# Patient Record
Sex: FEMALE | Race: WHITE | NOT HISPANIC OR LATINO | URBAN - METROPOLITAN AREA
[De-identification: names, ages, dates, MRNs, and addresses within clinical notes are randomized per-mention and may not be internally consistent; named-entity substitution may affect disease eponyms.]

---

## 2023-11-14 ENCOUNTER — HOSPITAL ENCOUNTER (OUTPATIENT)
Dept: RADIOLOGY | Facility: EXTERNAL LOCATION | Age: 43
Discharge: HOME | End: 2023-11-14
Payer: COMMERCIAL

## 2023-11-15 DIAGNOSIS — R92.8 ABNORMAL MRI, BREAST: Primary | ICD-10-CM
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
## 2023-11-16 PROBLEM — R79.89 LOW VITAMIN D LEVEL: Status: ACTIVE | Noted: 2019-07-09

## 2023-11-16 PROBLEM — S06.0X0A CONCUSSION WITH NO LOSS OF CONSCIOUSNESS: Status: ACTIVE | Noted: 2019-04-22

## 2023-11-16 PROBLEM — O26.21: Status: ACTIVE | Noted: 2021-02-13

## 2023-11-16 PROBLEM — Z98.890 HISTORY OF LOOP ELECTRICAL EXCISION PROCEDURE (LEEP): Status: ACTIVE | Noted: 2021-02-13

## 2023-11-16 PROBLEM — M54.30 SCIATIC LEG PAIN: Status: ACTIVE | Noted: 2019-04-22

## 2023-11-16 PROBLEM — S13.4XXA WHIPLASH INJURIES, INITIAL ENCOUNTER: Status: ACTIVE | Noted: 2019-04-22

## 2023-11-16 PROBLEM — S16.1XXA CERVICAL MYOFASCIAL STRAIN: Status: ACTIVE | Noted: 2019-04-22

## 2023-11-16 PROBLEM — N60.91 ATYPICAL DUCTAL HYPERPLASIA OF RIGHT BREAST: Status: ACTIVE | Noted: 2018-06-05

## 2023-11-16 PROBLEM — F41.8 ANXIETY ASSOCIATED WITH DEPRESSION: Status: ACTIVE | Noted: 2019-02-28

## 2023-11-16 PROBLEM — S39.012A ACUTE LUMBAR MYOFASCIAL STRAIN: Status: ACTIVE | Noted: 2019-04-22

## 2023-11-16 PROBLEM — D62 ACUTE BLOOD LOSS ANEMIA: Status: ACTIVE | Noted: 2021-08-16

## 2023-11-16 PROBLEM — O09.521 AMA (ADVANCED MATERNAL AGE) MULTIGRAVIDA 35+, FIRST TRIMESTER (HHS-HCC): Status: ACTIVE | Noted: 2021-02-13

## 2023-11-16 RX ORDER — BENZONATATE 100 MG/1
100 CAPSULE ORAL EVERY 8 HOURS PRN
COMMUNITY
Start: 2023-11-10

## 2023-11-16 RX ORDER — AZITHROMYCIN 250 MG/1
TABLET, FILM COATED ORAL
COMMUNITY
Start: 2023-10-28

## 2023-11-16 RX ORDER — METHYLPREDNISOLONE 4 MG/1
TABLET ORAL
COMMUNITY
Start: 2023-10-28

## 2023-11-16 RX ORDER — CEPHALEXIN 500 MG/1
500 CAPSULE ORAL 3 TIMES DAILY
COMMUNITY
Start: 2023-11-10 | End: 2023-11-20

## 2023-11-16 RX ORDER — ALBUTEROL SULFATE 90 UG/1
2 AEROSOL, METERED RESPIRATORY (INHALATION) EVERY 4 HOURS PRN
COMMUNITY
Start: 2023-11-10

## 2023-11-16 RX ORDER — ALPRAZOLAM 0.25 MG/1
TABLET ORAL
COMMUNITY
Start: 2023-06-09

## 2023-11-16 RX ORDER — OMEPRAZOLE 20 MG/1
20 CAPSULE, DELAYED RELEASE ORAL
COMMUNITY

## 2023-11-16 RX ORDER — ACETAMINOPHEN 500 MG
4000 TABLET ORAL
COMMUNITY
Start: 2017-12-12

## 2023-11-20 PROBLEM — R92.8 ABNORMAL MRI, BREAST: Status: ACTIVE | Noted: 2023-11-20

## 2023-11-20 NOTE — PROGRESS NOTES
Tatyana Huffman female   1980 43 y.o.  [id number]      Chief Complaint  New patient, biopsy consultation.    History Of Present Illness  Tatyana Huffman is a 43 y.o. female seen in the breast center for biopsy consultation. She has a history of a left excisional biopsy showing atypical ductal hyperplasia (ADH) and benign right core biopsy. She has family history of breast cancer in her maternal great aunt, 80's.    BREAST IMAGING: Outside imaging [facility] 2023 Fast MRI, indicates BI-RADS Category 4. Indeterminate right breast lower inner quadrant nonmass enhancement. Second look ultrasound right breast recommended. Outside imaging [facility] 2023 Right breast ultrasound showed no abnormality to correspond with the breast MRI. MRI guided core biopsy is recommended.     REPRODUCTIVE HISTORY: menarche age 12, , first birth age 34,  x 7 months, no OCP's, premenopausal, LMP 23, heterogeneously dense                                FAMILY CANCER HISTORY:   Maternal Great Aunt: Breast cancer, 80's  Father: Bladder cancer 60's    Review of Systems  Constitutional:  Negative for appetite change, fatigue, fever and unexpected weight change.   HENT:  Negative for ear pain, hearing loss, nosebleeds, sore throat and trouble swallowing.    Eyes:  Negative for discharge, itching and visual disturbance.   Breast: As stated in HPI.  Respiratory:  Negative for cough, chest tightness and shortness of breath.    Cardiovascular:  Negative for chest pain, palpitations and leg swelling.   Gastrointestinal:  Negative for abdominal pain, constipation, diarrhea and nausea.   Endocrine: Negative for cold intolerance and heat intolerance.   Genitourinary:  Negative for dysuria, frequency, hematuria, pelvic pain and vaginal bleeding.   Musculoskeletal:  Negative for arthralgias, back pain, gait problem, joint swelling and myalgias.   Skin:  Negative for color change and rash.   Allergic/Immunologic: Negative for  environmental allergies and food allergies.   Neurological:  Negative for dizziness, tremors, speech difficulty, weakness, numbness and headaches.   Hematological:  Does not bruise/bleed easily.   Psychiatric/Behavioral:  Negative for agitation, dysphoric mood and sleep disturbance. The patient is not nervous/anxious.       Past Medical History  She has no past medical history on file.    Surgical History  She has no past surgical history on file.    Family History  Cancer-related family history is not on file.     Social History  Social History     Tobacco Use    Smoking status: Never    Smokeless tobacco: Not on file   Substance Use Topics    Alcohol use: Not on file        Allergies  Codeine and Moxifloxacin    Medications  Current Outpatient Medications   Medication Instructions    albuterol 90 mcg/actuation inhaler 2 puffs, inhalation, Every 4 hours PRN    ALPRAZolam (Xanax) 0.25 mg tablet TAKE 1 TABLET BY MOUTH AT BEDTIME AS NEEDED FOR UP TO 3 DAYS.    azithromycin (Zithromax) 250 mg tablet TAKE 2 TABLETS BY MOUTH ONCE DAILY FOR 1 DAY THEN TAKE 1 TABLET BY MOUTH ONCE DAILY FOR 4 DAYS.    benzonatate (TESSALON) 100 mg, oral, Every 8 hours PRN    cephalexin (KEFLEX) 500 mg, oral, 3 times daily    cholecalciferol (VITAMIN D-3) 4,000 Units, oral, Daily RT    methylPREDNISolone (Medrol Dospak) 4 mg tablets TAKE 1 DOSE PACK BY MOUTH AS DIRECTED.    multivit-min/ferrous fumarate (MULTI VITAMIN ORAL) 1 capsule, oral, Daily RT    omeprazole (PRILOSEC) 20 mg, oral, Daily RT       Last Recorded Vitals  Blood pressure 124/86, pulse 77, temperature 37.1 °C (98.7 °F), temperature source Skin, weight 66.7 kg (147 lb 2.5 oz).    Physical Exam  Physical Exam  Patient is alert and oriented x3 and in a relaxed and appropriate mood. Her gait is steady and hand grasps are equal. Sclera is clear. The breasts are nearly symmetrical. The tissue is soft without palpable abnormalities, discrete nodules or masses. The skin and nipples  appear normal. There is no cervical, supraclavicular or axillary lymphadenopathy. Heart rate and rhythm normal, S1 and S2 appreciated. The lungs are clear to auscultation bilaterally. Abdomen is soft and non-tender.      Relevant Results and Imaging  BI mammo bilateral diagnostic tomosynthesis 09/28/2023    Narrative  * * *Final Report* * *    DATE OF EXAM: Sep 28 2023 10:34AM    [facility]   [id number]  -  [facility] DIAG W TRELL ALICIA  / ACCESSION #  [id number]    PROCEDURE REASON: multiple diagnoses    * * * * Physician Interpretation * * * *    RESULT: #[id number] - [facility] DIAG W TRELL ALICIA  #[id number] - [facility] US BREAST LTD RT    BILATERAL DIGITAL DIAGNOSTIC MAMMOGRAM TOMOSYNTHESIS WITH CAD: 9/28/2023    HISTORY: Multiple Diagnoses / Screening Mammogram-Patient reports NO  symptoms. Multiple Diagnoses.    RESULT:    TECHNIQUE: The study was acquired using full field digital technology and  interpreted from soft copy.  Digital Breast Tomosynthesis (DBT) images were obtained and used to  assist in the interpretation of this examination.  Current study was also evaluated with a Computer Aided Detection (CAD).  Comparison is made to exams dated: 8/3/2022 mammogram, 12/18/2018  mammogram, and 12/12/2017 mammogram - [facility].  The breasts are heterogeneously dense, which may obscure small masses.  The right breast has post-operative findings.  No significant masses, calcifications, or other findings are seen in  either breast.    Impression  IMPRESSION: NEGATIVE  No suspicious abnormality is identified to correspond to the nonmass  enhancement described on the outside FAST MRI in the lower inner quadrant  of the right breast.  Ultrasound is recommended for further evaluation.  There is no mammographic evidence of malignancy.        LIMITED ULTRASOUND OF RIGHT BREAST: 9/28/2023  RESULT:  Comparison is made to exams dated: 8/3/2022 mammogram, 12/18/2018  mammogram, and 12/12/2017 mammogram - [facility].  Color  flow and real-time ultrasound of the right breast lower inner  quadrant were performed.  Gray scale images of the real-time examination  were reviewed.          IMPRESSION: SUSPICIOUS FINDING - BIOPSY SHOULD BE CONSIDERED  There is no abnormality seen in the right breast to correspond with the  breast MRI finding in the lower inner quadrant, however, clinical  correlation and clinical followup are recommended.  MRI guided biopsy of  the nonmass enhancement in the lower inner right breast seen on the fast  MRI dated 08/25/2023 is recommended.        SUMMARY:  The results and recommendations were discussed with the patient at the  time of the examination. The patient signed the electronic consent form  and was instructed to schedule the biopsy prior to leaving the department.    Ingrid joshua/francesco:9/28/2023 12:01:58    Imaging Technologist(s): RT Damien(R)(M), [facility] [place]; RT Nara(R)(M), [facility]  [facility] [place]  OVERALL STUDY BIRADS: 4 Suspicious finding - Biopsy should be considered    : Francesco  Transcribe Date/Time: Sep 28 2023 10:33A    Dictated by : INGRID GATES MD    This examination was interpreted and the report reviewed and  electronically signed by:  INGRID GATES MD on Sep 28 2023 12:01PM  EST    Time was spent viewing digital images. I explained the results in depth, along with suggested explanation for follow up recommendations based on the testing results. BI-RADS Category 4    Visit Diagnosis  1. Abnormal MRI, breast          Assessment/Plan  Abnormal breast MRI, right breast nonmass enhancement, history left excisional biopsy ADH, benign right core biopsy, family history of breast cancer, heterogeneously dense    Plan:  Right breast MRI guided core biopsy.    Patient Discussion/Summary  I recommend a right breast MRI guided core biopsy. A breast radiology physician will perform the procedure. Possible  diagnoses include benign, atypia or cancer. Bruising and mild discomfort after the biopsy is normal and will improve. I typically have results in 5-7 business days. I will call you with the results, please have your phone handy to take my call. I will provide recommendations for future follow up based on your biopsy results.     IMPORTANT INFORMATION REGARDING YOUR RESULTS    If you receive medical information from My [facility] Personal Health Record (online chart) your results will be released into your chart. This means you may view or see results of your biopsy or procedure before I contact you directly. If this occurs, please call the office and we will discuss your results over the phone.    You can see your health information, review clinical summaries from office visits & test results online when you follow your health with MY  Chart, a personal health record. To sign up go to [email or web address]. If you need assistance with signing up or trouble getting into your account call [facility] Patient Line 24/7 at [phone].    My office phone number is [phone]  if you need to get in touch with me or have additional questions or concerns. Thank you for choosing [facility] and trusting me as your healthcare provider. I look forward to seeing you again at your next office visit. I am honored to be a provider on your health care team and I remain dedicated to helping you achieve your health goals.       NATALIE Kat-CNP

## 2023-11-22 ENCOUNTER — ANCILLARY PROCEDURE (OUTPATIENT)
Dept: RADIOLOGY | Facility: CLINIC | Age: 43
End: 2023-11-22
Payer: COMMERCIAL

## 2023-11-22 ENCOUNTER — PROCEDURE VISIT (OUTPATIENT)
Dept: SURGICAL ONCOLOGY | Facility: CLINIC | Age: 43
End: 2023-11-22
Payer: COMMERCIAL

## 2023-11-22 ENCOUNTER — HOSPITAL ENCOUNTER (OUTPATIENT)
Dept: RADIOLOGY | Facility: HOSPITAL | Age: 43
Discharge: HOME | End: 2023-11-22
Payer: COMMERCIAL

## 2023-11-22 VITALS
TEMPERATURE: 98.7 F | HEART RATE: 77 BPM | WEIGHT: 147.16 LBS | DIASTOLIC BLOOD PRESSURE: 86 MMHG | SYSTOLIC BLOOD PRESSURE: 124 MMHG

## 2023-11-22 DIAGNOSIS — R92.8 ABNORMAL MRI, BREAST: ICD-10-CM

## 2023-11-22 DIAGNOSIS — R92.8 ABNORMAL MRI, BREAST: Primary | ICD-10-CM

## 2023-11-22 PROCEDURE — A9575 INJ GADOTERATE MEGLUMI 0.1ML: HCPCS | Performed by: NURSE PRACTITIONER

## 2023-11-22 PROCEDURE — 88305 TISSUE EXAM BY PATHOLOGIST: CPT | Mod: TC,SUR,AHULAB | Performed by: NURSE PRACTITIONER

## 2023-11-22 PROCEDURE — 77065 DX MAMMO INCL CAD UNI: CPT | Performed by: RADIOLOGY

## 2023-11-22 PROCEDURE — 2720000007 HC OR 272 NO HCPCS

## 2023-11-22 PROCEDURE — 99204 OFFICE O/P NEW MOD 45 MIN: CPT | Performed by: NURSE PRACTITIONER

## 2023-11-22 PROCEDURE — 96372 THER/PROPH/DIAG INJ SC/IM: CPT | Performed by: RADIOLOGY

## 2023-11-22 PROCEDURE — 2550000001 HC RX 255 CONTRASTS: Performed by: NURSE PRACTITIONER

## 2023-11-22 PROCEDURE — 19085 BX BREAST 1ST LESION MR IMAG: CPT | Performed by: RADIOLOGY

## 2023-11-22 PROCEDURE — 88305 TISSUE EXAM BY PATHOLOGIST: CPT | Mod: TC,AHULAB | Performed by: NURSE PRACTITIONER

## 2023-11-22 PROCEDURE — 77065 DX MAMMO INCL CAD UNI: CPT

## 2023-11-22 PROCEDURE — 99214 OFFICE O/P EST MOD 30 MIN: CPT | Mod: 25 | Performed by: NURSE PRACTITIONER

## 2023-11-22 PROCEDURE — 88305 TISSUE EXAM BY PATHOLOGIST: CPT | Performed by: PATHOLOGY

## 2023-11-22 PROCEDURE — 2500000005 HC RX 250 GENERAL PHARMACY W/O HCPCS: Performed by: RADIOLOGY

## 2023-11-22 PROCEDURE — 19085 BX BREAST 1ST LESION MR IMAG: CPT | Mod: RT

## 2023-11-22 RX ORDER — GADOTERATE MEGLUMINE 376.9 MG/ML
13 INJECTION INTRAVENOUS
Status: COMPLETED | OUTPATIENT
Start: 2023-11-22 | End: 2023-11-22

## 2023-11-22 RX ADMIN — Medication 10 ML: at 13:54

## 2023-11-22 RX ADMIN — GADOTERATE MEGLUMINE 13 ML: 376.9 INJECTION INTRAVENOUS at 14:48

## 2023-11-22 ASSESSMENT — PAIN SCALES - GENERAL: PAINLEVEL: 0-NO PAIN

## 2023-11-22 ASSESSMENT — PAIN - FUNCTIONAL ASSESSMENT: PAIN_FUNCTIONAL_ASSESSMENT: 0-10

## 2023-11-27 ENCOUNTER — TELEPHONE (OUTPATIENT)
Dept: SURGICAL ONCOLOGY | Facility: HOSPITAL | Age: 43
End: 2023-11-27
Payer: COMMERCIAL

## 2023-11-27 LAB
LABORATORY COMMENT REPORT: NORMAL
PATH REPORT.FINAL DX SPEC: NORMAL
PATH REPORT.GROSS SPEC: NORMAL
PATH REPORT.TOTAL CANCER: NORMAL

## 2023-11-27 NOTE — TELEPHONE ENCOUNTER
Result Communication    Spoke with Tatyana Huffman regarding breast biopsy results, benign. Awaiting concordance report, patient aware. If concordant, return to PCP for annual mammograms.       Resulted Orders   Surgical Pathology Exam   Result Value Ref Range    Case Report       Surgical Pathology                                Case: [id number]                                  Authorizing Provider:  CHACE Kat    Collected:           11/22/2023 1420              Ordering Location:     [facility]    Received:            11/22/2023 1520              Pathologist:           Heidi Hopson MD                                                         Specimen:    BREAST CORE BIOPSY RIGHT, MRI guided, vacuum assisted non mass enhancement                 FINAL DIAGNOSIS       A.  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                By the signature on this report, the individual or group listed as making the Final Interpretation/Diagnosis certifies that they have reviewed this case.       Gross Description       Received in formalin, labeled with the patient´s name and hospital number, are multiple irregular/cylindrical segments of yellow-white fatty soft tissue aggregating to 3.5 x 2.5 x 0.4 cm.  The specimen is submitted in toto in 3 cassettes.  AE    NOTE:  Ischemia time: 11/22/2023, 2:20 PM.  This specimen was placed into formalin at: 11/23/2023, time not specified.         2:39 PM

## 2024-04-01 ENCOUNTER — HOSPITAL ENCOUNTER (OUTPATIENT)
Dept: RADIOLOGY | Facility: CLINIC | Age: 44
Discharge: HOME | End: 2024-04-01
Payer: COMMERCIAL

## 2024-04-01 DIAGNOSIS — E78.2 MIXED HYPERLIPIDEMIA: ICD-10-CM

## 2024-04-01 DIAGNOSIS — I10 ESSENTIAL (PRIMARY) HYPERTENSION: ICD-10-CM

## 2024-04-01 PROCEDURE — 75571 CT HRT W/O DYE W/CA TEST: CPT
